# Patient Record
Sex: FEMALE | URBAN - METROPOLITAN AREA
[De-identification: names, ages, dates, MRNs, and addresses within clinical notes are randomized per-mention and may not be internally consistent; named-entity substitution may affect disease eponyms.]

---

## 2021-04-13 ENCOUNTER — OFFICE VISIT (OUTPATIENT)
Dept: URBAN - METROPOLITAN AREA CLINIC 92 | Facility: CLINIC | Age: 66
End: 2021-04-13

## 2021-04-13 PROCEDURE — 99212 OFFICE O/P EST SF 10 MIN: CPT | Performed by: OPTOMETRIST

## 2021-04-13 RX ORDER — NEOMYCIN SULFATE, POLYMYXIN B SULFATE AND DEXAMETHASONE 3.5; 10000; 1 MG/G; [USP'U]/G; MG/G
OINTMENT OPHTHALMIC
Qty: 0 | Refills: 0 | Status: INACTIVE
Start: 2021-04-13 | End: 2021-06-01

## 2021-04-13 ASSESSMENT — INTRAOCULAR PRESSURE
OS: 11
OD: 16

## 2021-04-13 NOTE — IMPRESSION/PLAN
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 Plan: OD: Discussed diagnosis in detail with patient. Discussed option of Patching or placement of BCL. Patients decided to go forward with placement of BCL. Tears QID OD Gel HS OD

## 2021-04-20 ENCOUNTER — OFFICE VISIT (OUTPATIENT)
Dept: URBAN - METROPOLITAN AREA CLINIC 92 | Facility: CLINIC | Age: 66
End: 2021-04-20

## 2021-04-20 DIAGNOSIS — G51.0 BELL'S PALSY: ICD-10-CM

## 2021-04-20 PROCEDURE — 99213 OFFICE O/P EST LOW 20 MIN: CPT | Performed by: OPTOMETRIST

## 2021-04-20 ASSESSMENT — INTRAOCULAR PRESSURE
OD: 13
OS: 14

## 2021-04-20 NOTE — IMPRESSION/PLAN
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 Plan: OD: Discussed diagnosis in detail with patient. Patient tolerating BCL well,  recommend keeping BCL in place today. Tears QID OD Gel HS OD

## 2021-05-04 ENCOUNTER — Encounter (OUTPATIENT)
Dept: URBAN - METROPOLITAN AREA CLINIC 92 | Facility: CLINIC | Age: 66
End: 2021-05-04

## 2021-05-04 PROCEDURE — 99212 OFFICE O/P EST SF 10 MIN: CPT | Performed by: OPTOMETRIST

## 2021-05-18 ENCOUNTER — OFFICE VISIT (OUTPATIENT)
Dept: URBAN - METROPOLITAN AREA CLINIC 92 | Facility: CLINIC | Age: 66
End: 2021-05-18

## 2021-05-18 DIAGNOSIS — H16.141 PUNCTATE KERATITIS, RIGHT EYE: Primary | ICD-10-CM

## 2021-05-18 PROCEDURE — 99212 OFFICE O/P EST SF 10 MIN: CPT | Performed by: OPTOMETRIST

## 2021-05-18 ASSESSMENT — INTRAOCULAR PRESSURE
OS: 16
OD: 13

## 2021-05-18 NOTE — IMPRESSION/PLAN
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 Plan: OD: Discussed diagnosis in detail with patient. Patient tolerating BCL well, left BCL in place. Continue Maxitrol Qday OD 
continue tears and gel for comfort.

## 2021-06-01 ENCOUNTER — OFFICE VISIT (OUTPATIENT)
Dept: URBAN - METROPOLITAN AREA CLINIC 92 | Facility: CLINIC | Age: 66
End: 2021-06-01

## 2021-06-01 PROCEDURE — 99212 OFFICE O/P EST SF 10 MIN: CPT | Performed by: OPTOMETRIST

## 2021-06-01 PROCEDURE — 92071 CONTACT LENS FITTING FOR TX: CPT | Performed by: OPTOMETRIST

## 2021-06-01 RX ORDER — NEOMYCIN SULFATE, POLYMYXIN B SULFATE AND DEXAMETHASONE 3.5; 10000; 1 MG/ML; [USP'U]/ML; MG/ML
SUSPENSION OPHTHALMIC
Qty: 5 | Refills: 0 | Status: INACTIVE
Start: 2021-06-01 | End: 2021-07-13

## 2021-06-01 ASSESSMENT — INTRAOCULAR PRESSURE
OD: 12
OS: 11

## 2021-06-01 NOTE — IMPRESSION/PLAN
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 Plan: OD: Replaced BCL OD today. Continue Maxitrol Qday OD 
continue tears and gel for comfort.

## 2021-07-13 ENCOUNTER — OFFICE VISIT (OUTPATIENT)
Dept: URBAN - METROPOLITAN AREA CLINIC 92 | Facility: CLINIC | Age: 66
End: 2021-07-13

## 2021-07-13 PROCEDURE — 99212 OFFICE O/P EST SF 10 MIN: CPT | Performed by: OPTOMETRIST

## 2021-07-13 RX ORDER — NEOMYCIN SULFATE, POLYMYXIN B SULFATE AND DEXAMETHASONE 3.5; 10000; 1 MG/ML; [USP'U]/ML; MG/ML
SUSPENSION OPHTHALMIC
Qty: 5 | Refills: 0 | Status: ACTIVE
Start: 2021-07-13

## 2021-07-13 ASSESSMENT — INTRAOCULAR PRESSURE
OS: 22
OD: 23

## 2021-07-13 NOTE — IMPRESSION/PLAN
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 Plan: BCL removed today in office. Continue artificial tears as needed for comfort.  Continue Maxitrol QDAY OD.